# Patient Record
Sex: FEMALE | Race: WHITE | Employment: FULL TIME | URBAN - METROPOLITAN AREA
[De-identification: names, ages, dates, MRNs, and addresses within clinical notes are randomized per-mention and may not be internally consistent; named-entity substitution may affect disease eponyms.]

---

## 2019-03-01 ENCOUNTER — HOSPITAL ENCOUNTER (OUTPATIENT)
Dept: CT IMAGING | Age: 44
Discharge: HOME | End: 2019-03-01
Payer: SELF-PAY

## 2019-03-01 DIAGNOSIS — E78.00 HIGH CHOLESTEROL: ICD-10-CM

## 2019-03-01 PROCEDURE — 75571 CT HRT W/O DYE W/CA TEST: CPT

## 2020-09-30 ENCOUNTER — TELEPHONE (OUTPATIENT)
Dept: SURGERY | Age: 45
End: 2020-09-30

## 2020-09-30 NOTE — TELEPHONE ENCOUNTER
Flora SCOTT/REGINA on nurse voicemail letting us know that she had shared patient's imaging via [employer]. I had the mammogram department check, and these are not in the system. I called her back and let her know that if she shares them via [employer] that she must use the tag [id number]. I let her know that we prefer that this are sent by  to our office for the patient's appointment. I left my name and number, but I did not hear back from her.

## 2020-10-01 ENCOUNTER — DOCUMENTATION ONLY (OUTPATIENT)
Dept: SURGERY | Age: 45
End: 2020-10-01

## 2020-10-19 ENCOUNTER — TELEPHONE (OUTPATIENT)
Dept: SURGERY | Age: 45
End: 2020-10-19

## 2020-10-19 ENCOUNTER — DOCUMENTATION ONLY (OUTPATIENT)
Dept: SURGERY | Age: 45
End: 2020-10-19

## 2020-10-19 NOTE — TELEPHONE ENCOUNTER
Called patient because she cancelled her breast talk appointment. Wanted to have her permission to shred her disc from [facility]. She has chosen another provider and gives me permission to shred her disc from [facility]. She was very appreciative.

## 2022-07-02 ENCOUNTER — HOSPITAL ENCOUNTER (EMERGENCY)
Age: 47
Discharge: HOME | End: 2022-07-02
Attending: STUDENT IN AN ORGANIZED HEALTH CARE EDUCATION/TRAINING PROGRAM
Payer: COMMERCIAL

## 2022-07-02 VITALS
SYSTOLIC BLOOD PRESSURE: 133 MMHG | DIASTOLIC BLOOD PRESSURE: 79 MMHG | HEIGHT: 69 IN | RESPIRATION RATE: 16 BRPM | HEART RATE: 66 BPM | WEIGHT: 140 LBS | TEMPERATURE: 98.2 F | BODY MASS INDEX: 20.73 KG/M2 | OXYGEN SATURATION: 95 %

## 2022-07-02 DIAGNOSIS — S61.012A LACERATION OF LEFT THUMB WITHOUT FOREIGN BODY WITHOUT DAMAGE TO NAIL, INITIAL ENCOUNTER: Primary | ICD-10-CM

## 2022-07-02 PROCEDURE — 99282 EMERGENCY DEPT VISIT SF MDM: CPT

## 2022-07-02 PROCEDURE — 75810000293 HC SIMP/SUPERF WND  RPR

## 2022-07-02 RX ORDER — TAMOXIFEN CITRATE 20 MG/1
20 TABLET ORAL DAILY
COMMUNITY

## 2022-07-02 RX ORDER — LIDOCAINE HYDROCHLORIDE 10 MG/ML
5 INJECTION INFILTRATION; PERINEURAL ONCE
Status: DISCONTINUED | OUTPATIENT
Start: 2022-07-02 | End: 2022-07-02 | Stop reason: HOSPADM

## 2022-07-02 RX ORDER — IBUPROFEN 400 MG/1
400 TABLET ORAL
COMMUNITY

## 2022-07-02 NOTE — ED TRIAGE NOTES
Pt reports cuttin gher left thumb on a  blade around 1300 today. Bleeding controlled with pressure. Pt able to bend thumb without difficulty.

## 2022-07-03 NOTE — ED PROVIDER NOTES
The patient is a 79-year-old female presenting today with a laceration. She reports that she cut her left thumb on a  blade, that was not running, prior to arrival.  She went to her neighbor's house who is an ER doctor who thought about gluing it but since the bleeding would not stop she came here. Minimal pain noted. Her last tetanus shot was 7 years ago. She has no other injuries. Past Medical History:   Diagnosis Date    Breast cancer ([address]     Hypercholesterolemia        Past Surgical History:   Procedure Laterality Date    HX BILATERAL MASTECTOMY Bilateral 04/2021    HX GYN  1993    cone biopsy         History reviewed. No pertinent family history. Social History     Socioeconomic History    Marital status:      Spouse name: Not on file    Number of children: Not on file    Years of education: Not on file    Highest education level: Not on file   Occupational History    Not on file   Tobacco Use    Smoking status: Never Smoker    Smokeless tobacco: Never Used   Substance and Sexual Activity    Alcohol use: Yes     Comment: occasional    Drug use: Never    Sexual activity: Yes     Partners: Male     Birth control/protection: None, Surgical   Other Topics Concern    Not on file   Social History Narrative    Not on file     Social Determinants of Health     Financial Resource Strain:     Difficulty of Paying Living Expenses: Not on file   Food Insecurity:     Worried About Running Out of Food in the Last Year: Not on file    [place] of Food in the Last Year: Not on file   Transportation Needs:     Lack of Transportation (Medical): Not on file    Lack of Transportation (Non-Medical):  Not on file   Physical Activity:     Days of Exercise per Week: Not on file    Minutes of Exercise per Session: Not on file   Stress:     Feeling of Stress : Not on file   Social Connections:     Frequency of Communication with Friends and Family: Not on file    Frequency of Social Gatherings with Friends and Family: Not on file    Attends Confucianism Services: Not on file    Active Member of Clubs or Organizations: Not on file    Attends Club or Organization Meetings: Not on file    Marital Status: Not on file   Intimate Partner Violence:     Fear of Current or Ex-Partner: Not on file    Emotionally Abused: Not on file    Physically Abused: Not on file    Sexually Abused: Not on file   Housing Stability:     Unable to Pay for Housing in the Last Year: Not on file    Number of [place] in the Last Year: Not on file    Unstable Housing in the Last Year: Not on file         ALLERGIES: Pcn [penicillins]    Review of Systems   Skin: Positive for wound. All other systems reviewed and are negative. Vitals:    07/02/22 1657 07/02/22 1658 07/02/22 1659 07/02/22 1701   BP:    133/79   Pulse:    66   Resp:    16   Temp:       SpO2: 100% 100% 100% 95%   Weight:       Height:                Physical Exam  Constitutional:       General: She is not in acute distress. Appearance: She is well-developed. HENT:      Head: Normocephalic. Eyes:      Conjunctiva/sclera: Conjunctivae normal.   Pulmonary:      Effort: Pulmonary effort is normal. No respiratory distress. Musculoskeletal:         General: Normal range of motion. Cervical back: Normal range of motion. Skin:     General: Skin is warm and dry. Capillary Refill: Capillary refill takes less than 2 seconds. Comments: 1.5 cm laceration to the lateral aspect of her left thumb, not overlying the joint. Appears to be superficial.  Full range of motion of all joints of the thumb. Neurovascularly intact. Psychiatric:         Behavior: Behavior normal.          MDM       Procedures            Procedure Note - Wound Repair:    Performed by Charmaine Rubinstein, DO .      Immediately prior to the procedure, the patient was reevaluated and found suitable for the planned procedure and any planned medications. Immediately prior to the procedure a time out was called to verify the correct patient, procedure, equipment, staff, and marking as appropriate. Tendon/Joint function was Intact. Neurovascular function was Intact. Wound irrigated with normal saline and explored. Wound was located on the left thumb, measured 1.5 cm and was linear and clean wound edges. Level of complexity was: simple. Wound was closed using Dermabond. Foreign body was not suspected. Foreign body was not found. Procedure was tolerated well.

## 2022-08-09 ENCOUNTER — OFFICE VISIT (OUTPATIENT)
Dept: ORTHOPEDIC SURGERY | Age: 47
End: 2022-08-09
Payer: COMMERCIAL

## 2022-08-09 VITALS — HEIGHT: 69 IN | BODY MASS INDEX: 20.73 KG/M2 | WEIGHT: 140 LBS

## 2022-08-09 DIAGNOSIS — G89.29 CHRONIC BILATERAL LOW BACK PAIN WITH LEFT-SIDED SCIATICA: Primary | ICD-10-CM

## 2022-08-09 DIAGNOSIS — Z85.3 HISTORY OF BREAST CANCER IN FEMALE: ICD-10-CM

## 2022-08-09 DIAGNOSIS — M54.41 CHRONIC RIGHT-SIDED LOW BACK PAIN WITH RIGHT-SIDED SCIATICA: ICD-10-CM

## 2022-08-09 DIAGNOSIS — M54.16 LUMBAR RADICULOPATHY: ICD-10-CM

## 2022-08-09 DIAGNOSIS — M54.42 CHRONIC BILATERAL LOW BACK PAIN WITH LEFT-SIDED SCIATICA: Primary | ICD-10-CM

## 2022-08-09 DIAGNOSIS — G89.29 CHRONIC RIGHT-SIDED LOW BACK PAIN WITH RIGHT-SIDED SCIATICA: ICD-10-CM

## 2022-08-09 DIAGNOSIS — M51.36 DEGENERATIVE DISC DISEASE, LUMBAR: ICD-10-CM

## 2022-08-09 PROCEDURE — 99204 OFFICE O/P NEW MOD 45 MIN: CPT | Performed by: STUDENT IN AN ORGANIZED HEALTH CARE EDUCATION/TRAINING PROGRAM

## 2022-08-09 NOTE — PROGRESS NOTES
Giana Zuleta (: 1975) is a 49 y.o. female here for evaluation of the following chief complaint(s):  Back Pain       ASSESSMENT/PLAN:  Below is the assessment and plan develop[d based on review of pertinent history, physical exam, labs, studies, and medications. 1. Chronic bilateral low back pain with left-sided sciatica  -     XR SPINE LUMB 2 OR 3 V; Future  -     MRI LUMB SPINE WO CONT; Future  2. Degenerative disc disease, lumbar  -     MRI LUMB SPINE WO CONT; Future  3. Lumbar radiculopathy  -     MRI LUMB SPINE WO CONT; Future  4. History of breast cancer in female  5. Chronic right-sided low back pain with right-sided sciatica      Return in about 4 weeks (around 2022). Patient has failed conservative management in the form of physical therapy and steroids. I would like to order an MRI lumbar spine to further assess. I will see her back afterwards. She should continue physical therapy in the meantime. If she needs pain relief she can take over-the-counter Aleve 440 mg twice a day for up to 2 weeks. Red flag symptoms discussed with the patient. Patient is to present to the emergency department if any of these symptoms occur. Patient verbalized understanding and agrees to proceed with the aforementioned plan. Thank you for allowing me to participate in the care of this patient. SUBJECTIVE/OBJECTIVE:    HPI    Patient is a pleasant active 40-year-old female with a significant past medical history of breast cancer 2 years ago treated with surgery and chemotherapy. She is cancer free at this time and is being followed routinely every 3 months. She did have a remote history of finger neuropathy from her chemotherapy which has resolved with Cymbalta and time. She is here today for roughly 6-week history of left greater than right leg pain extending into her buttock, posterolateral thigh, and lateral leg.   Pain is worse with sitting, better with lying down and walking. She has associated paresthesias. She has no weakness. She has taken a course of steroids which helped for approximately 1 week. She is in physical therapy currently and feels that it is helping somewhat. She denies red flag symptoms. She has no constitutional symptoms. Chief Complaint   Patient presents with    Back Pain     Current Outpatient Medications   Medication Sig    tamoxifen (NOLVADEX) 20 mg tablet Take 20 mg by mouth daily. ibuprofen (MOTRIN) 400 mg tablet Take 400 mg by mouth every six (6) hours as needed for Pain.    simvastatin (ZOCOR) 10 mg tablet Take 20 mg by mouth nightly. No current facility-administered medications for this visit. Past Medical History:   Diagnosis Date    Breast cancer ([facility] [address]     Hypercholesterolemia      Past Surgical History:   Procedure Laterality Date    HX BILATERAL MASTECTOMY Bilateral 04/2021    HX GYN  1993    cone biopsy     History reviewed. No pertinent family history.   Social History     Tobacco Use    Smoking status: Never    Smokeless tobacco: Never   Substance Use Topics    Alcohol use: Yes     Comment: occasional    Drug use: Never      Social History     Tobacco Use   Smoking Status Never   Smokeless Tobacco Never     Social History     Substance and Sexual Activity   Alcohol Use Yes    Comment: occasional       Review of Systems  Red flag symptoms: None  Bowel/Bladder/Saddle Anesthesia: Denies  Weakness/Sensory Disturbance: No weakness, left leg paresthesias  Ambulation/Falls: Ambulates without assist, no fall history    Ht 5' 9\" (1.753 m)   Wt 140 lb (63.5 kg)   BMI 20.67 kg/m²      Physical Exam    GENERAL:  AAOx3, appears stated age, no distress  Body habitus: Normal    LOWER EXTREMITIES:  Gait: Intact heel toe and tandem gait   Motor: 5/5 in all myotomes L3-S1 bilaterally  Sensory: Intact to light touch in all dermatomes L4-S1 bilaterally  Reflexes: Absent but symmetric L4 and S1 bilaterally  Pathological reflexes: No sustained clonus, downgoing Babinski bilaterally   Special tests: Negative seated SLR bilaterally    IMAGING:    XR Results (most recent):  Results from Appointment encounter on 08/09/22    XR SPINE LUMB 2 OR 3 V    Narrative  4 view lumbar spine including flexion-extension with normal lordosis and moderate disc degeneration with minimal disc height collapse at L5-S1. Mild to moderate neuroforaminal stenosis at this level. No instability on dynamic films. No coronal deformity. An electronic signature was used to authenticate this note.   -- Calderon Martines, DO

## 2022-08-16 ENCOUNTER — HOSPITAL ENCOUNTER (OUTPATIENT)
Dept: MRI IMAGING | Age: 47
Discharge: HOME | End: 2022-08-16
Attending: STUDENT IN AN ORGANIZED HEALTH CARE EDUCATION/TRAINING PROGRAM
Payer: COMMERCIAL

## 2022-08-16 DIAGNOSIS — M54.16 LUMBAR RADICULOPATHY: ICD-10-CM

## 2022-08-16 DIAGNOSIS — M54.42 CHRONIC BILATERAL LOW BACK PAIN WITH LEFT-SIDED SCIATICA: ICD-10-CM

## 2022-08-16 DIAGNOSIS — M51.36 DEGENERATIVE DISC DISEASE, LUMBAR: ICD-10-CM

## 2022-08-16 DIAGNOSIS — G89.29 CHRONIC BILATERAL LOW BACK PAIN WITH LEFT-SIDED SCIATICA: ICD-10-CM

## 2022-08-16 PROCEDURE — 72148 MRI LUMBAR SPINE W/O DYE: CPT

## 2022-08-19 ENCOUNTER — TELEPHONE (OUTPATIENT)
Dept: ORTHOPEDIC SURGERY | Age: 47
End: 2022-08-19

## 2022-08-19 NOTE — TELEPHONE ENCOUNTER
Insurance company wants a peer to peer with Dr. Haim Curtis about the MRI ordered. Call [phone]. Case #[id number]         Note    Brian Arnold DENIED     Procedures:  [id number] - MRI LUMB SPINE WO CONT  INSURANCE:   SOURCE: Website  SOURCE DETAILS: [facility]  CASE/TRACKING #: [id number]  DENIAL REASON: Your doctor told us that you have low back pain. Your doctor ordered an MRI of your lower back. An MRI is a way to take pictures of the inside of your body. This test should be used when the pain has not improved after six weeks of treatment by your doctor. Treatment should include medications and other forms of therapy. These need to include home exercises or physical therapy. We reviewed the notes we have. The notes do not show that you had at least six weeks of such treatment. Based on the information we have, this test is not medically necessary. We used [facility] Guideline titled Imaging of the Spine to make this decision. You may view this guideline at [email or web address]. CALL REF #: n/a  P2P phone#: [phone]  P2P expiration date: n/a     MDO CONTACT INFO: [phone]  SPOKE TO: NORM Staples, Dr. Kristin Myers Nurse left p2p phone number and order ID in  along with my direct phone number.    MDO CONTACT DATE: 08/17/2022

## 2022-08-23 ENCOUNTER — OFFICE VISIT (OUTPATIENT)
Dept: ORTHOPEDIC SURGERY | Age: 47
End: 2022-08-23
Payer: COMMERCIAL

## 2022-08-23 VITALS — HEIGHT: 69 IN | WEIGHT: 140 LBS | BODY MASS INDEX: 20.73 KG/M2

## 2022-08-23 DIAGNOSIS — M51.26 LUMBAR DISC HERNIATION: Primary | ICD-10-CM

## 2022-08-23 DIAGNOSIS — M54.16 LUMBAR RADICULOPATHY, CHRONIC: ICD-10-CM

## 2022-08-23 PROCEDURE — 99214 OFFICE O/P EST MOD 30 MIN: CPT | Performed by: STUDENT IN AN ORGANIZED HEALTH CARE EDUCATION/TRAINING PROGRAM

## 2022-08-23 NOTE — PROGRESS NOTES
Yokasta Dutton (: 1975) is a 49 y.o. female here for evaluation of the following chief complaint(s):  Back Pain (MRI results.//)       ASSESSMENT/PLAN:  Below is the assessment and plan developed based on review of pertinent history, physical exam, labs, studies, and medications. 1. Lumbar disc herniation  2. Lumbar radiculopathy, chronic    Return in about 2 months (around 10/23/2022). Patient continues to improve with conservative management. I would like to see her back in 2 months for interval evaluation. Continue physical therapy in the meantime. Red flag symptoms discussed with the patient. Patient is to present to the emergency department if any of these symptoms occur. Patient verbalized understanding and agrees to proceed with the aforementioned plan. Thank you for allowing me to participate in the care of this patient. SUBJECTIVE/OBJECTIVE:    HPI    Patient is a pleasant 25-year-old female who is well-known to the spine service. She is here today for MRI review. She has a L5-S1 disc herniation but she is improving clinically with physical therapy and time. Her pain is primarily in her buttock and proximal posterior thigh at this point time. She has no red flag symptoms. Chief Complaint   Patient presents with    Back Pain     MRI results. Current Outpatient Medications   Medication Sig    tamoxifen (NOLVADEX) 20 mg tablet Take 20 mg by mouth daily. ibuprofen (MOTRIN) 400 mg tablet Take 400 mg by mouth every six (6) hours as needed for Pain.    simvastatin (ZOCOR) 10 mg tablet Take 20 mg by mouth nightly. No current facility-administered medications for this visit. Past Medical History:   Diagnosis Date    Breast cancer ([facility] [address]     Hypercholesterolemia      Past Surgical History:   Procedure Laterality Date    HX BILATERAL MASTECTOMY Bilateral 2021    HX GYN  1993    cone biopsy     History reviewed.  No pertinent family history. Social History     Tobacco Use    Smoking status: Never    Smokeless tobacco: Never   Substance Use Topics    Alcohol use: Yes     Comment: occasional    Drug use: Never      Social History     Tobacco Use   Smoking Status Never   Smokeless Tobacco Never     Social History     Substance and Sexual Activity   Alcohol Use Yes    Comment: occasional     Review of Systems  Red flag symptoms: None  Bowel/Bladder/Saddle Anesthesia: Denies  Weakness/Sensory Disturbance: No weakness, left leg paresthesias but improving  Ambulation/Falls: Ambulates without assist, no fall history     Ht 5' 9\" (1.753 m)   Wt 140 lb (63.5 kg)   BMI 20.67 kg/m²       Physical Exam     GENERAL:  AAOx3, appears stated age, no distress  Body habitus: Normal     LOWER EXTREMITIES:  Gait: Intact heel toe and tandem gait   Motor: 5/5 in all myotomes L3-S1 bilaterally  Sensory: Intact to light touch in all dermatomes L4-S1 bilaterally  Reflexes: Absent but symmetric L4 and S1 bilaterally  Pathological reflexes: No sustained clonus, downgoing Babinski bilaterally   Special tests: Negative seated SLR bilaterally      IMAGING:    MRI Results (most recent):  Results from Hospital Encounter encounter on 08/16/22    MRI LUMB SPINE WO CONT    Narrative  EXAM: MRI LUMB SPINE WO CONT  Clinical history: radiculopathy  INDICATION: . Lumbago with sciatica, left side    COMPARISON: None    TECHNIQUE: MR imaging of the lumbar spine was performed using the following  sequences: sagittal T1, T2, STIR;  axial T1, T2.    CONTRAST:  None. FINDINGS:    Type I Modic degenerative change at L5-S1. Trace retrolisthesis. The abdominal  aorta is normal. The proximal common iliac vessels are normal in caliber. Vertebral body heights are maintained. Marrow signal is normal.    The conus medullaris terminates at T12/L1. Signal and caliber of the distal  spinal cord are within normal limits. The paraspinal soft tissues are within normal limits.     Lower thoracic spine: No herniation or stenosis. L1-L2: No herniation or stenosis. L2-L3: No herniation or stenosis. L3-L4: No herniation or stenosis. L4-L5: Minimal disc desiccation. Mild central protrusion with symmetric. Minimal  ligamentum flavum hypertrophy. Canal is patent. Foramina are patent    L5-S1: Minimal retrolisthesis measures 3 mm. Mild facet arthropathy. Central,  left paracentral and left lateral recess disc protrusion with superimposed  caudally oriented extrusion at the left lateral recess. There is severe canal  stenosis posterior to S1. There is moderate left foraminal stenosis. Effacement  of nerve roots extending to the left S1 foramen. Impression  Disc degenerative change is most pronounced at L5-S1. Caudally oriented disc extrusion at the left lateral recess/left paracentral  region of L5-S1. Severe canal stenosis posterior to the S1 vertebral level. Moderate left foraminal stenosis at L5-S1 with effacement of nerve roots  extending to the left S1 foramen. Type I Modic degenerative changes at L5-S1. An electronic signature was used to authenticate this note.   -- Georgi Means, DO

## 2022-11-21 ENCOUNTER — OFFICE VISIT (OUTPATIENT)
Dept: ORTHOPEDIC SURGERY | Age: 47
End: 2022-11-21
Payer: COMMERCIAL

## 2022-11-21 VITALS — WEIGHT: 140 LBS | HEIGHT: 69 IN | BODY MASS INDEX: 20.73 KG/M2

## 2022-11-21 DIAGNOSIS — M51.26 LUMBAR DISC HERNIATION: Primary | ICD-10-CM

## 2022-11-21 DIAGNOSIS — G89.29 CHRONIC BILATERAL LOW BACK PAIN WITH LEFT-SIDED SCIATICA: ICD-10-CM

## 2022-11-21 DIAGNOSIS — M54.42 CHRONIC BILATERAL LOW BACK PAIN WITH LEFT-SIDED SCIATICA: ICD-10-CM

## 2022-11-21 DIAGNOSIS — M54.16 LUMBAR RADICULOPATHY: ICD-10-CM

## 2022-11-21 DIAGNOSIS — Z85.3 HISTORY OF BREAST CANCER IN FEMALE: ICD-10-CM

## 2022-11-21 DIAGNOSIS — M54.16 LUMBAR RADICULOPATHY, CHRONIC: ICD-10-CM

## 2022-11-21 DIAGNOSIS — M51.36 DEGENERATIVE DISC DISEASE, LUMBAR: ICD-10-CM

## 2022-11-21 PROCEDURE — 99213 OFFICE O/P EST LOW 20 MIN: CPT | Performed by: STUDENT IN AN ORGANIZED HEALTH CARE EDUCATION/TRAINING PROGRAM

## 2022-11-21 NOTE — PROGRESS NOTES
Xi Packer (: 1975) is a 49 y.o. female here for evaluation of the following chief complaint(s):  Back Pain and Follow-up (PT helped with pain.)       ASSESSMENT/PLAN:  Below is the assessment and plan developed based on review of pertinent history, physical exam, labs, studies, and medications. 1. Lumbar disc herniation  2. Lumbar radiculopathy, chronic  3. Chronic bilateral low back pain with left-sided sciatica  4. Lumbar radiculopathy  5. History of breast cancer in female  6. Degenerative disc disease, lumbar    Return in about 6 months (around 2023) for Routine Follow Up (Non-Op). Patient's pain is about 75% improved. If her pain recurs she can simply call us for a telehealth visit and we will remotely order a lumbar epidural steroid injection. SUBJECTIVE/OBJECTIVE:  HPI  Patient is a pleasant 67-year-old female who is well-known to the spine service. She is here today for interval evaluation and routine follow-up. Her lumbar radiculopathy is approximately 75% improved. She has been compliant with conservative management in the form of physical therapy and anti-inflammatory medications in time. She has no new red flag symptoms. Review of Systems  See above HPI and prior clinic notes for full ROS     Ht 5' 9\" (1.753 m)   Wt 140 lb (63.5 kg)   BMI 20.67 kg/m²    Physical Exam  No interval change in PE, grossly motor/sensory intact, no new neurological deficits    IMAGING:  No new imaging obtained today. An electronic signature was used to authenticate this note.   -- Morgan Rodriguez DO

## 2023-05-22 RX ORDER — SIMVASTATIN 10 MG
20 TABLET ORAL NIGHTLY
COMMUNITY

## 2023-05-22 RX ORDER — TAMOXIFEN CITRATE 20 MG/1
20 TABLET ORAL DAILY
COMMUNITY

## 2023-05-22 RX ORDER — IBUPROFEN 400 MG/1
400 TABLET ORAL EVERY 6 HOURS PRN
COMMUNITY

## 2024-09-04 ENCOUNTER — APPOINTMENT (OUTPATIENT)
Facility: HOSPITAL | Age: 49
DRG: 069 | End: 2024-09-04
Payer: COMMERCIAL

## 2024-09-04 ENCOUNTER — HOSPITAL ENCOUNTER (INPATIENT)
Facility: HOSPITAL | Age: 49
LOS: 1 days | Discharge: HOME | DRG: 069 | End: 2024-09-05
Attending: EMERGENCY MEDICINE | Admitting: FAMILY MEDICINE
Payer: COMMERCIAL

## 2024-09-04 DIAGNOSIS — R29.90 STROKE-LIKE SYMPTOMS: Primary | ICD-10-CM

## 2024-09-04 DIAGNOSIS — R20.0 LEFT SIDED NUMBNESS: ICD-10-CM

## 2024-09-04 DIAGNOSIS — R56.9 SEIZURE-LIKE ACTIVITY (HCC): ICD-10-CM

## 2024-09-04 LAB
ALBUMIN SERPL-MCNC: 4 G/DL (ref 3.5–5)
ALBUMIN/GLOB SERPL: 1.2 (ref 1.1–2.2)
ALP SERPL-CCNC: 53 U/L (ref 45–117)
ALT SERPL-CCNC: 26 U/L (ref 12–78)
ANION GAP SERPL CALC-SCNC: 7 MMOL/L (ref 5–15)
AST SERPL-CCNC: 23 U/L (ref 15–37)
BASOPHILS # BLD: 0 K/UL (ref 0–0.1)
BASOPHILS NFR BLD: 1 % (ref 0–1)
BILIRUB SERPL-MCNC: 0.6 MG/DL (ref 0.2–1)
BUN SERPL-MCNC: 9 MG/DL (ref 6–20)
BUN/CREAT SERPL: 10 (ref 12–20)
CALCIUM SERPL-MCNC: 8.7 MG/DL (ref 8.5–10.1)
CHLORIDE SERPL-SCNC: 99 MMOL/L (ref 97–108)
CO2 SERPL-SCNC: 32 MMOL/L (ref 21–32)
CREAT SERPL-MCNC: 0.92 MG/DL (ref 0.55–1.02)
DIFFERENTIAL METHOD BLD: NORMAL
EKG ATRIAL RATE: 87 BPM
EKG DIAGNOSIS: NORMAL
EKG P AXIS: 84 DEGREES
EKG P-R INTERVAL: 142 MS
EKG Q-T INTERVAL: 398 MS
EKG QRS DURATION: 88 MS
EKG QTC CALCULATION (BAZETT): 478 MS
EKG R AXIS: 84 DEGREES
EKG T AXIS: 67 DEGREES
EKG VENTRICULAR RATE: 87 BPM
EOSINOPHIL # BLD: 0 K/UL (ref 0–0.4)
EOSINOPHIL NFR BLD: 1 % (ref 0–7)
ERYTHROCYTE [DISTWIDTH] IN BLOOD BY AUTOMATED COUNT: 11.9 % (ref 11.5–14.5)
GLOBULIN SER CALC-MCNC: 3.4 G/DL (ref 2–4)
GLUCOSE BLD STRIP.AUTO-MCNC: 133 MG/DL (ref 65–117)
GLUCOSE SERPL-MCNC: 136 MG/DL (ref 65–100)
HCT VFR BLD AUTO: 38.6 % (ref 35–47)
HGB BLD-MCNC: 12.8 G/DL (ref 11.5–16)
IMM GRANULOCYTES # BLD AUTO: 0 K/UL (ref 0–0.04)
IMM GRANULOCYTES NFR BLD AUTO: 0 % (ref 0–0.5)
LYMPHOCYTES # BLD: 1.6 K/UL (ref 0.8–3.5)
LYMPHOCYTES NFR BLD: 30 % (ref 12–49)
MCH RBC QN AUTO: 30.6 PG (ref 26–34)
MCHC RBC AUTO-ENTMCNC: 33.2 G/DL (ref 30–36.5)
MCV RBC AUTO: 92.3 FL (ref 80–99)
MONOCYTES # BLD: 0.4 K/UL (ref 0–1)
MONOCYTES NFR BLD: 7 % (ref 5–13)
NEUTS SEG # BLD: 3.3 K/UL (ref 1.8–8)
NEUTS SEG NFR BLD: 61 % (ref 32–75)
NRBC # BLD: 0 K/UL (ref 0–0.01)
NRBC BLD-RTO: 0 PER 100 WBC
PLATELET # BLD AUTO: 270 K/UL (ref 150–400)
PMV BLD AUTO: 9.8 FL (ref 8.9–12.9)
POTASSIUM SERPL-SCNC: 3.8 MMOL/L (ref 3.5–5.1)
PROT SERPL-MCNC: 7.4 G/DL (ref 6.4–8.2)
RBC # BLD AUTO: 4.18 M/UL (ref 3.8–5.2)
SERVICE CMNT-IMP: ABNORMAL
SODIUM SERPL-SCNC: 138 MMOL/L (ref 136–145)
WBC # BLD AUTO: 5.4 K/UL (ref 3.6–11)

## 2024-09-04 PROCEDURE — 2580000003 HC RX 258: Performed by: FAMILY MEDICINE

## 2024-09-04 PROCEDURE — 70551 MRI BRAIN STEM W/O DYE: CPT

## 2024-09-04 PROCEDURE — 70450 CT HEAD/BRAIN W/O DYE: CPT

## 2024-09-04 PROCEDURE — G0378 HOSPITAL OBSERVATION PER HR: HCPCS

## 2024-09-04 PROCEDURE — 4A03X5D MEASUREMENT OF ARTERIAL FLOW, INTRACRANIAL, EXTERNAL APPROACH: ICD-10-PCS | Performed by: FAMILY MEDICINE

## 2024-09-04 PROCEDURE — 99285 EMERGENCY DEPT VISIT HI MDM: CPT

## 2024-09-04 PROCEDURE — 6370000000 HC RX 637 (ALT 250 FOR IP): Performed by: FAMILY MEDICINE

## 2024-09-04 PROCEDURE — 6360000004 HC RX CONTRAST MEDICATION: Performed by: EMERGENCY MEDICINE

## 2024-09-04 PROCEDURE — 85025 COMPLETE CBC W/AUTO DIFF WBC: CPT

## 2024-09-04 PROCEDURE — 36415 COLL VENOUS BLD VENIPUNCTURE: CPT

## 2024-09-04 PROCEDURE — 93005 ELECTROCARDIOGRAM TRACING: CPT | Performed by: EMERGENCY MEDICINE

## 2024-09-04 PROCEDURE — 70496 CT ANGIOGRAPHY HEAD: CPT

## 2024-09-04 PROCEDURE — 82962 GLUCOSE BLOOD TEST: CPT

## 2024-09-04 PROCEDURE — 80053 COMPREHEN METABOLIC PANEL: CPT

## 2024-09-04 RX ORDER — ACETAMINOPHEN 325 MG/1
650 TABLET ORAL EVERY 4 HOURS PRN
Status: DISCONTINUED | OUTPATIENT
Start: 2024-09-04 | End: 2024-09-05 | Stop reason: HOSPADM

## 2024-09-04 RX ORDER — ACETAMINOPHEN 650 MG/1
650 SUPPOSITORY RECTAL EVERY 4 HOURS PRN
Status: DISCONTINUED | OUTPATIENT
Start: 2024-09-04 | End: 2024-09-05 | Stop reason: HOSPADM

## 2024-09-04 RX ORDER — ASPIRIN 300 MG/1
300 SUPPOSITORY RECTAL DAILY
Status: DISCONTINUED | OUTPATIENT
Start: 2024-09-04 | End: 2024-09-05 | Stop reason: HOSPADM

## 2024-09-04 RX ORDER — IOPAMIDOL 755 MG/ML
100 INJECTION, SOLUTION INTRAVASCULAR
Status: COMPLETED | OUTPATIENT
Start: 2024-09-04 | End: 2024-09-04

## 2024-09-04 RX ORDER — ONDANSETRON 4 MG/1
4 TABLET, ORALLY DISINTEGRATING ORAL EVERY 8 HOURS PRN
Status: DISCONTINUED | OUTPATIENT
Start: 2024-09-04 | End: 2024-09-05 | Stop reason: HOSPADM

## 2024-09-04 RX ORDER — VENLAFAXINE 37.5 MG/1
37.5 TABLET ORAL NIGHTLY
COMMUNITY

## 2024-09-04 RX ORDER — SODIUM CHLORIDE 0.9 % (FLUSH) 0.9 %
5-40 SYRINGE (ML) INJECTION EVERY 12 HOURS SCHEDULED
Status: DISCONTINUED | OUTPATIENT
Start: 2024-09-04 | End: 2024-09-05 | Stop reason: HOSPADM

## 2024-09-04 RX ORDER — SODIUM CHLORIDE 9 MG/ML
INJECTION, SOLUTION INTRAVENOUS PRN
Status: DISCONTINUED | OUTPATIENT
Start: 2024-09-04 | End: 2024-09-05 | Stop reason: HOSPADM

## 2024-09-04 RX ORDER — ASPIRIN 81 MG/1
81 TABLET, CHEWABLE ORAL DAILY
Status: DISCONTINUED | OUTPATIENT
Start: 2024-09-04 | End: 2024-09-05 | Stop reason: HOSPADM

## 2024-09-04 RX ORDER — POLYETHYLENE GLYCOL 3350 17 G/17G
17 POWDER, FOR SOLUTION ORAL DAILY PRN
Status: DISCONTINUED | OUTPATIENT
Start: 2024-09-04 | End: 2024-09-05 | Stop reason: HOSPADM

## 2024-09-04 RX ORDER — VENLAFAXINE 37.5 MG/1
37.5 TABLET ORAL NIGHTLY
Status: DISCONTINUED | OUTPATIENT
Start: 2024-09-04 | End: 2024-09-05 | Stop reason: HOSPADM

## 2024-09-04 RX ORDER — TAMOXIFEN CITRATE 10 MG/1
20 TABLET ORAL NIGHTLY
Status: DISCONTINUED | OUTPATIENT
Start: 2024-09-04 | End: 2024-09-05 | Stop reason: HOSPADM

## 2024-09-04 RX ORDER — LORAZEPAM 2 MG/ML
INJECTION INTRAMUSCULAR
Status: DISCONTINUED
Start: 2024-09-04 | End: 2024-09-04 | Stop reason: WASHOUT

## 2024-09-04 RX ORDER — SODIUM CHLORIDE 0.9 % (FLUSH) 0.9 %
5-40 SYRINGE (ML) INJECTION PRN
Status: DISCONTINUED | OUTPATIENT
Start: 2024-09-04 | End: 2024-09-05 | Stop reason: HOSPADM

## 2024-09-04 RX ORDER — ATORVASTATIN CALCIUM 10 MG/1
10 TABLET, FILM COATED ORAL DAILY
Status: DISCONTINUED | OUTPATIENT
Start: 2024-09-05 | End: 2024-09-05

## 2024-09-04 RX ORDER — ONDANSETRON 2 MG/ML
4 INJECTION INTRAMUSCULAR; INTRAVENOUS EVERY 6 HOURS PRN
Status: DISCONTINUED | OUTPATIENT
Start: 2024-09-04 | End: 2024-09-05 | Stop reason: HOSPADM

## 2024-09-04 RX ADMIN — SODIUM CHLORIDE, PRESERVATIVE FREE 10 ML: 5 INJECTION INTRAVENOUS at 21:02

## 2024-09-04 RX ADMIN — VENLAFAXINE 37.5 MG: 37.5 TABLET ORAL at 22:51

## 2024-09-04 RX ADMIN — TAMOXIFEN CITRATE 20 MG: 10 TABLET ORAL at 22:51

## 2024-09-04 RX ADMIN — IOPAMIDOL 100 ML: 755 INJECTION, SOLUTION INTRAVENOUS at 12:28

## 2024-09-04 RX ADMIN — ASPIRIN 81 MG CHEWABLE TABLET 81 MG: 81 TABLET CHEWABLE at 21:02

## 2024-09-04 ASSESSMENT — PAIN - FUNCTIONAL ASSESSMENT: PAIN_FUNCTIONAL_ASSESSMENT: NONE - DENIES PAIN

## 2024-09-04 NOTE — ED NOTES
4/2- lvm for patient to call back to reschedule her appt to see MD.[facility]   4/5- spoke with patient let her know we need to reschedule her appt to see MD's she states she'll call back currently at work.[facility]   4/10-LVM FOR PATIENT TO CALL BACK TO RESCHEDULE APPTS.[facility]      Emergency Line line, Code Stroke Level 2 Van Negative

## 2024-09-04 NOTE — ED TRIAGE NOTES
Patient is ambulatory in Triage with a steady gait. Patient reports this morning woke up at around 9am with numbness to the left leg and left arm. Last known well last night at 2300. Patient reports Hx of herniation disk. Reports chronic back pain, Saturday reports fainting episode of syncope with LOC when was getting up of the bed. In Triage patient reports lesser sensation to the left arm only, no weakness to legs or arms. NIH 1 (left arm decreased sensation). Reports well fell, she hit her left side (arm and leg).  in Triage.

## 2024-09-04 NOTE — ED NOTES
Patient had a witnessed seizure in T1 that lasted a few seconds. Patient alert but pale and diaphoretic post seizure. Dr Raphael called to triage.

## 2024-09-04 NOTE — ED PROVIDER NOTES
[facility] EMERGENCY CTR  EMERGENCY DEPARTMENT ENCOUNTER      Pt Name: Nicole Caceres  MRN: [id number]  Birthdate 1975  Date of evaluation: 9/4/2024  Provider: Kushal Raphael MD    CHIEF COMPLAINT       Chief Complaint   Patient presents with    Back Pain    Numbness         HISTORY OF PRESENT ILLNESS   (Location/Symptom, Timing/Onset, Context/Setting, Quality, Duration, Modifying Factors, Severity)  Note limiting factors.   Ms. Caceres is a 49-year-old female who presents to the ER with complaints of left arm and leg numbness.  She said that she went to bed around 930 last night.  She not have any numbness at that time.  When she woke this morning, she noted that her left arm and left leg were numb.  She denied having difficulty using it at that time.  She reports that on Saturday she had an episode where she fell and lost consciousness.  Her  states that he was in the room with her prior to this.  He had stepped outside and heard her fall.  She had lost consciousness but she was awake when he got back to the room.  No seizure-like activity was noted.    Just prior to my initial assessment in the emergency room, she had just gotten IV.  She had an episode where she became unresponsive.  She was having contractures of her arms and legs and o'clock she was posturing briefly.  This episode lasted a brief period of time, likely less than 15 to 30 seconds.  She seemed somewhat confused afterwards but was able to answer questions appropriately.                Review of External Medical Records:     Nursing Notes were reviewed.    REVIEW OF SYSTEMS    (2-9 systems for level 4, 10 or more for level 5)     Review of Systems   Neurological:  Positive for syncope and numbness.       Except as noted above the remainder of the review of systems was reviewed and negative.       PAST MEDICAL HISTORY     Past Medical History:   Diagnosis Date    Breast cancer (HCC)     Hypercholesterolemia   minutes.         Ms. Caceres is a 48yo female who presents to the ER with complaints of left arm and left leg weakness.  She also had a syncopal event several days ago.  She was last normal yesterday evening.  She had an episode in the emergency room which is unclear if she had a seizure or vasovagal syncope.  She recently had an IV placed but she did have some posturing.  She seen by teleneurologist recommends admission to the hospital for stroke workup.  She is to be evaluated for admission by the hospitalist        REASSESSMENT            CONSULTS:  None    PROCEDURES:  Unless otherwise noted below, none     Procedures      FINAL IMPRESSION    No diagnosis found.      DISPOSITION/PLAN   DISPOSITION        PATIENT REFERRED TO:  No follow-up provider specified.    DISCHARGE MEDICATIONS:  New Prescriptions    No medications on file         (Please note that portions of this note were completed with a voice recognition program.  Efforts were made to edit the dictations but occasionally words are mis-transcribed.)    Kushal Raphael MD (electronically signed)  Emergency Attending Physician / Physician Assistant / Nurse Practitioner              Kushal Raphael MD  09/04/24 [id number]

## 2024-09-05 ENCOUNTER — APPOINTMENT (OUTPATIENT)
Facility: HOSPITAL | Age: 49
DRG: 069 | End: 2024-09-05
Attending: FAMILY MEDICINE
Payer: COMMERCIAL

## 2024-09-05 ENCOUNTER — APPOINTMENT (OUTPATIENT)
Facility: HOSPITAL | Age: 49
DRG: 069 | End: 2024-09-05
Payer: COMMERCIAL

## 2024-09-05 VITALS
SYSTOLIC BLOOD PRESSURE: 113 MMHG | TEMPERATURE: 98.7 F | OXYGEN SATURATION: 100 % | DIASTOLIC BLOOD PRESSURE: 75 MMHG | HEIGHT: 69 IN | BODY MASS INDEX: 22.53 KG/M2 | RESPIRATION RATE: 17 BRPM | WEIGHT: 152.12 LBS | HEART RATE: 93 BPM

## 2024-09-05 PROBLEM — R29.90 STROKE-LIKE EPISODE: Status: RESOLVED | Noted: 2024-09-05 | Resolved: 2024-09-05

## 2024-09-05 PROBLEM — R29.90 STROKE-LIKE EPISODE: Status: ACTIVE | Noted: 2024-09-05

## 2024-09-05 PROBLEM — R29.90 STROKE-LIKE SYMPTOMS: Status: RESOLVED | Noted: 2024-09-04 | Resolved: 2024-09-05

## 2024-09-05 PROBLEM — R55 VASOVAGAL SYNCOPE: Status: RESOLVED | Noted: 2024-09-05 | Resolved: 2024-09-05

## 2024-09-05 PROBLEM — R55 VASOVAGAL SYNCOPE: Status: ACTIVE | Noted: 2024-09-05

## 2024-09-05 LAB
CHOLEST SERPL-MCNC: 191 MG/DL
ECHO AO ROOT DIAM: 2 CM
ECHO AO ROOT INDEX: 1.09 CM/M2
ECHO AV AREA PEAK VELOCITY: 2.8 CM2
ECHO AV AREA VTI: 3.3 CM2
ECHO AV AREA/BSA PEAK VELOCITY: 1.5 CM2/M2
ECHO AV AREA/BSA VTI: 1.8 CM2/M2
ECHO AV MEAN GRADIENT: 2 MMHG
ECHO AV MEAN VELOCITY: 0.7 M/S
ECHO AV PEAK GRADIENT: 4 MMHG
ECHO AV PEAK VELOCITY: 1 M/S
ECHO AV VELOCITY RATIO: 0.9
ECHO AV VTI: 17.4 CM
ECHO BSA: 1.83 M2
ECHO LA DIAMETER INDEX: 1.36 CM/M2
ECHO LA DIAMETER: 2.5 CM
ECHO LA TO AORTIC ROOT RATIO: 1.25
ECHO LA VOL A-L A2C: 15 ML (ref 22–52)
ECHO LA VOL A-L A4C: 23 ML (ref 22–52)
ECHO LA VOL MOD A2C: 14 ML (ref 22–52)
ECHO LA VOL MOD A4C: 20 ML (ref 22–52)
ECHO LA VOLUME AREA LENGTH: 20 ML
ECHO LA VOLUME INDEX A-L A2C: 8 ML/M2 (ref 16–34)
ECHO LA VOLUME INDEX A-L A4C: 13 ML/M2 (ref 16–34)
ECHO LA VOLUME INDEX AREA LENGTH: 11 ML/M2 (ref 16–34)
ECHO LA VOLUME INDEX MOD A2C: 8 ML/M2 (ref 16–34)
ECHO LA VOLUME INDEX MOD A4C: 11 ML/M2 (ref 16–34)
ECHO LV E' SEPTAL VELOCITY: 7 CM/S
ECHO LV EDV A2C: 54 ML
ECHO LV EDV A4C: 64 ML
ECHO LV EDV BP: 61 ML (ref 56–104)
ECHO LV EDV INDEX A4C: 35 ML/M2
ECHO LV EDV INDEX BP: 33 ML/M2
ECHO LV EDV NDEX A2C: 29 ML/M2
ECHO LV EF PHYSICIAN: 60 %
ECHO LV EJECTION FRACTION A2C: 57 %
ECHO LV EJECTION FRACTION A4C: 61 %
ECHO LV EJECTION FRACTION BIPLANE: 57 % (ref 55–100)
ECHO LV ESV A2C: 23 ML
ECHO LV ESV A4C: 25 ML
ECHO LV ESV BP: 26 ML (ref 19–49)
ECHO LV ESV INDEX A2C: 13 ML/M2
ECHO LV ESV INDEX A4C: 14 ML/M2
ECHO LV ESV INDEX BP: 14 ML/M2
ECHO LV FRACTIONAL SHORTENING: -21 % (ref 28–44)
ECHO LV INTERNAL DIMENSION DIASTOLE INDEX: 1.52 CM/M2
ECHO LV INTERNAL DIMENSION DIASTOLIC: 2.8 CM (ref 3.9–5.3)
ECHO LV INTERNAL DIMENSION SYSTOLIC INDEX: 1.85 CM/M2
ECHO LV INTERNAL DIMENSION SYSTOLIC: 3.4 CM
ECHO LV IVSD: 0.5 CM (ref 0.6–0.9)
ECHO LV MASS 2D: 171.2 G (ref 67–162)
ECHO LV MASS INDEX 2D: 93 G/M2 (ref 43–95)
ECHO LV POSTERIOR WALL DIASTOLIC: 2.8 CM (ref 0.6–0.9)
ECHO LV RELATIVE WALL THICKNESS RATIO: 2
ECHO LVOT AREA: 3.5 CM2
ECHO LVOT AV VTI INDEX: 0.98
ECHO LVOT DIAM: 2.1 CM
ECHO LVOT MEAN GRADIENT: 2 MMHG
ECHO LVOT PEAK GRADIENT: 3 MMHG
ECHO LVOT PEAK VELOCITY: 0.9 M/S
ECHO LVOT STROKE VOLUME INDEX: 32.2 ML/M2
ECHO LVOT SV: 59.2 ML
ECHO LVOT VTI: 17.1 CM
ECHO MV A VELOCITY: 0.38 M/S
ECHO MV AREA PHT: 4 CM2
ECHO MV E DECELERATION TIME (DT): 190.8 MS
ECHO MV E VELOCITY: 0.57 M/S
ECHO MV E/A RATIO: 1.5
ECHO MV E/E' SEPTAL: 8.14
ECHO MV PRESSURE HALF TIME (PHT): 55.3 MS
ECHO RV TAPSE: 2.7 CM (ref 1.7–?)
ERYTHROCYTE [DISTWIDTH] IN BLOOD BY AUTOMATED COUNT: 11.8 % (ref 11.5–14.5)
EST. AVERAGE GLUCOSE BLD GHB EST-MCNC: 103 MG/DL
HBA1C MFR BLD: 5.2 % (ref 4–5.6)
HCT VFR BLD AUTO: 37.4 % (ref 35–47)
HDLC SERPL-MCNC: 88 MG/DL
HDLC SERPL: 2.2 (ref 0–5)
HGB BLD-MCNC: 12.2 G/DL (ref 11.5–16)
LDLC SERPL CALC-MCNC: 86.6 MG/DL (ref 0–100)
MCH RBC QN AUTO: 30.4 PG (ref 26–34)
MCHC RBC AUTO-ENTMCNC: 32.6 G/DL (ref 30–36.5)
MCV RBC AUTO: 93.3 FL (ref 80–99)
NRBC # BLD: 0 K/UL (ref 0–0.01)
NRBC BLD-RTO: 0 PER 100 WBC
PLATELET # BLD AUTO: 255 K/UL (ref 150–400)
PMV BLD AUTO: 9.8 FL (ref 8.9–12.9)
RBC # BLD AUTO: 4.01 M/UL (ref 3.8–5.2)
TRIGL SERPL-MCNC: 82 MG/DL
TROPONIN I SERPL HS-MCNC: 15 NG/L (ref 0–51)
VLDLC SERPL CALC-MCNC: 16.4 MG/DL
WBC # BLD AUTO: 6.5 K/UL (ref 3.6–11)

## 2024-09-05 PROCEDURE — 85027 COMPLETE CBC AUTOMATED: CPT

## 2024-09-05 PROCEDURE — A9579 GAD-BASE MR CONTRAST NOS,1ML: HCPCS

## 2024-09-05 PROCEDURE — 95816 EEG AWAKE AND DROWSY: CPT | Performed by: PSYCHIATRY & NEUROLOGY

## 2024-09-05 PROCEDURE — 84484 ASSAY OF TROPONIN QUANT: CPT

## 2024-09-05 PROCEDURE — G0378 HOSPITAL OBSERVATION PER HR: HCPCS

## 2024-09-05 PROCEDURE — 97161 PT EVAL LOW COMPLEX 20 MIN: CPT

## 2024-09-05 PROCEDURE — 96374 THER/PROPH/DIAG INJ IV PUSH: CPT

## 2024-09-05 PROCEDURE — 80061 LIPID PANEL: CPT

## 2024-09-05 PROCEDURE — 83036 HEMOGLOBIN GLYCOSYLATED A1C: CPT

## 2024-09-05 PROCEDURE — 95816 EEG AWAKE AND DROWSY: CPT

## 2024-09-05 PROCEDURE — 6360000002 HC RX W HCPCS: Performed by: PSYCHIATRY & NEUROLOGY

## 2024-09-05 PROCEDURE — 93306 TTE W/DOPPLER COMPLETE: CPT

## 2024-09-05 PROCEDURE — 97116 GAIT TRAINING THERAPY: CPT

## 2024-09-05 PROCEDURE — 6370000000 HC RX 637 (ALT 250 FOR IP): Performed by: FAMILY MEDICINE

## 2024-09-05 PROCEDURE — 1100000003 HC PRIVATE W/ TELEMETRY

## 2024-09-05 PROCEDURE — 2580000003 HC RX 258: Performed by: FAMILY MEDICINE

## 2024-09-05 PROCEDURE — 6360000004 HC RX CONTRAST MEDICATION

## 2024-09-05 PROCEDURE — 97165 OT EVAL LOW COMPLEX 30 MIN: CPT

## 2024-09-05 PROCEDURE — 70552 MRI BRAIN STEM W/DYE: CPT

## 2024-09-05 PROCEDURE — 99223 1ST HOSP IP/OBS HIGH 75: CPT | Performed by: PSYCHIATRY & NEUROLOGY

## 2024-09-05 RX ORDER — ATORVASTATIN CALCIUM 40 MG/1
40 TABLET, FILM COATED ORAL DAILY
Status: DISCONTINUED | OUTPATIENT
Start: 2024-09-06 | End: 2024-09-05 | Stop reason: HOSPADM

## 2024-09-05 RX ORDER — ATORVASTATIN CALCIUM 40 MG/1
40 TABLET, FILM COATED ORAL DAILY
Qty: 30 TABLET | Refills: 3 | Status: SHIPPED | OUTPATIENT
Start: 2024-09-06

## 2024-09-05 RX ORDER — LORAZEPAM 2 MG/ML
2 INJECTION INTRAMUSCULAR
Status: COMPLETED | OUTPATIENT
Start: 2024-09-05 | End: 2024-09-05

## 2024-09-05 RX ORDER — ASPIRIN 81 MG/1
81 TABLET, CHEWABLE ORAL DAILY
Qty: 30 TABLET | Refills: 3 | Status: SHIPPED | OUTPATIENT
Start: 2024-09-06

## 2024-09-05 RX ADMIN — ASPIRIN 81 MG CHEWABLE TABLET 81 MG: 81 TABLET CHEWABLE at 09:49

## 2024-09-05 RX ADMIN — GADOTERIDOL 14 ML: 279.3 INJECTION, SOLUTION INTRAVENOUS at 16:54

## 2024-09-05 RX ADMIN — LORAZEPAM 2 MG: 2 INJECTION INTRAMUSCULAR; INTRAVENOUS at 16:31

## 2024-09-05 RX ADMIN — ATORVASTATIN CALCIUM 10 MG: 10 TABLET, FILM COATED ORAL at 09:49

## 2024-09-05 RX ADMIN — SODIUM CHLORIDE, PRESERVATIVE FREE 10 ML: 5 INJECTION INTRAVENOUS at 09:51

## 2024-09-05 NOTE — PLAN OF CARE
Problem: Safety - Adult  Goal: Free from fall injury  Outcome: Progressing  Note: Bed is in the lowest position and wheels are locked, call bell is within reach, bathroom light is on during evening hours, gripper socks are on and patient has been instructed to call out for assistance if needed.     As of now, patient is free from falls and will continue to be monitored.

## 2024-09-05 NOTE — CONSULTS
Neurology Consult Note     NAME: Nicole Lambert-Alvin   :  1975   MRN:  [id number]   DATE:   2024       HPI:  Pt is a 57yo LH female admitted 24  on transfer from [facility] with c/o left arm and leg numbness on awakening and episode of LOC on 24. NIHSS 1 for left arm numbness. In the ED, just after getting the IV, pt had an episode of unresponsiveness with \"contractures of her arms and legs,\" lasted 15-30 seconds, \"somewhat confused afterwards, but was able to answer questions appropriately.\"  CTH neg. CTA H/N no LVO, no stenosis.  MRI brain without contrast was neg.   Patient is seen with her  at the bedside.  She reports that she first noticed the numbness yesterday morning in her left leg and then about an hour later it moved her left arm.  It felt tingly like she had slept on it wrong.  She could move her extremities and could walk, but felt unsteady.  Her symptoms continued to get worse so they went to the ED at 11:30AM after contacting her spine physician Dr. Young, has a history of L4-L5 HNP with recent flareup, but never had numbness associated with it in the past  She also and calling her breast cancer physician Dr. NG.  Patient has a history of breast cancer diagnosed in 2020 treated with bilateral mastectomies chemotherapy and now immunotherapy.  Patient reports her left sided numbness resolved after a couple of hours in the emergency department.  Additionally patient had an episode of LOC on Saturday, 2024.  She been lying on the back porch and started to stand up and the next thing she knew she was on the ground.  She did not have any warning.  Her  heard a thud and immediately was at her side and she was at baseline sitting up on the floor.  While in the ED yesterday she had her IV placed and told her   carotid arteries are patent with no flow-limiting  stenosis.  % of right carotid artery stenosis: 0  % of left carotid artery stenosis: 0  Measurements utilizing NASCET criteria.  NASCET method was utilized for calculating stenosis.    Left vertebral artery is larger than the right vertebral artery. Tortuosity of  the vertebral artery on the left.. The cervical soft tissues are unremarkable.  There are degenerative changes of the cervical spine.    CTA HEAD  There is no major vessel occlusion.Petrous and cavernous carotid arteries are  patent.M1 segments are patent. Symmetric arborization of M2 vessels is  demonstrated. The basilar artery is patent. The proximal P1 segments are patent  bilaterally.There is no aneurysm. There are no sizable posterior communicating  arteries.  No evidence of acute intracranial hemorrhage or midline shift is demonstrated.    Impression  There is no major vessel occlusion.    There is no acute intracranial process.  There is no aneurysm, dissection or hemodynamically significant stenosis.        Electronically signed by SHARON HABIB      Assessment and Plan:   Pt is a 57yo LH female with h/o breast cancer dx in 9/2020 s/p bilateral mastectomy, chemo, and immunotherapy, admitted 9/4/24 with left leg, followed one hour later by left arm numbness, resolved after a few hours in ED.  Additionally, had a spell of LOC on standing 8/31/24, very brief, immediately back to baseline, and in ED just after getting the IV, pt had an episode of LOC with stiffening/flexion of arms, lasted 15-30 seconds, immediately able to answer questions afterwards. Had prodrome of not feeling well and black spots in vision. She also notes that is not uncommon for her to have lightheadedness with standing. CTH neg. CTA H/N no LVO, no stenosis.  MRI brain without contrast was neg.   Exam is non-focal.     Left arm and leg numbness of unclear etiology. TIA is a possibility, but only risk factor is HLD.   Spell of LOC on

## 2024-09-05 NOTE — PROGRESS NOTES
[facility]                                                                                          Hospitalist Progress Note  Maryam Kennedy PA-C  Answering service: [phone] OR [phone] from in house phone        Date of Service:  2024  NAME:  Nicole Caceres  :  1975  MRN:  [id number]       Admission Summary:     Nicole Caceres is a 49 y.o. female past medical history of breast cancer, status post bilateral mastectomy, hyperlipidemia, depression, herniated disc, chronic back pain presents as a direct admission/transfer from [facility] ED to [facility] with chief complaints of numbness of left arm and leg.  Symptoms onset reportedly was noted on awakening this morning.  Symptoms noted remain constant, moderate to severe, without specific alleviating factors.  There is no reports of slurred speech, facial droop, visual disturbance, chest pain, shortness of breath, abdominal pain, fever, chills, bowel/bladder incontinence, or saddle anesthesia.  She was last known well last night at 2300 hrs. on 9/3/2024.  For initial ER reports, patient had syncopal episode with loss of consciousness after mobilizing out of bed on 2024.  Today, initial reported vital signs in ED were temperature 97.9 °F, /83, heart rate 73, respiratory rate 14, O2 saturation 100 send room air.  12-lead EKG shows sinus rhythm, occasional PVCs, PACs, ST/T wave changes in the septal leads 87 bpm.  Blood glucose 137 mg/dL.  CT head without IV contrast showed no acute intracranial normalities.  CTA head and neck with IV contrast showed no major vessel occlusion, no acute intracranial process, no aneurysm, no dissection, no hemodynamically significant flow-limiting stenosis.  Per ER reports, patient had seizure-like versus syncopal episode in the ED.  Patient was assessed NIHSS = 1.  Patient was seen remotely by teleneurologist in consultation who    Transportation Needs: No Transportation Needs (9/4/2024)    PRAPARE - Transportation     Lack of Transportation (Medical): No     Lack of Transportation (Non-Medical): No   Physical Activity: Not on file   Stress: Not on file   Social Connections: Not on file   Intimate Partner Violence: Not on file   Depression: Not at risk (8/23/2022)    PHQ-2     PHQ-2 Score: 0   Housing Stability: Low Risk  (9/4/2024)    Housing Stability Vital Sign     Unable to Pay for Housing in the Last Year: No     Number of Times Moved in the Last Year: 1     Homeless in the Last Year: No   Interpersonal Safety: Not At Risk (9/4/2024)    Interpersonal Safety Domain Source: IP Abuse Screening     Physical abuse: Denies     Verbal abuse: Denies     Emotional abuse: Denies     Financial abuse: Denies     Sexual abuse: Denies   Utilities: Not At Risk (9/4/2024)    [facility] Utilities     Threatened with loss of utilities: No       Review of Systems:   Pertinent items are noted in HPI.       Vital Signs:    Last 24hrs VS reviewed since prior progress note. Most recent are:  Vitals:    09/05/24 0944   BP: 107/78   Pulse: 61   Resp: 18   Temp: 97.4 °F (36.3 °C)   SpO2: 100%       No intake or output data in the 24 hours ending 09/05/24 1400     Physical Examination:     I had a face to face encounter with this patient and independently examined them on 9/5/2024 as outlined below:          General : alert x 3, awake, no acute distress,   HEENT: PEERL, EOMI, moist mucus membrane  Neck: supple, no JVD, no meningeal signs  Chest: Clear to auscultation bilaterally   CVS: S1 S2 heard, Capillary refill less than 2 seconds  Abd: soft/ non tender, non distended, BS physiological,   Ext: no clubbing, no cyanosis, no edema, brisk 2+ DP pulses  Neuro/Psych: pleasant mood and affect, CN 2-12 grossly intact, sensory grossly within normal limit, Strength 5/5 in all extremities  Skin: warm     Data Review:    Review and/or order of clinical lab test      I have

## 2024-09-05 NOTE — PROGRESS NOTES
Speech Pathology Note    Reviewed chart and note SLP orders received as part of stroke order set. Pertinent medical information below. Discussed case with RN and OT who reported no SLP-related concerns    Given pertinent medical information as below and discussion with RN, formal SLP evaluation not clinically indicated at this time. Will sign off. Please re-consult if further needs arise. Thank you.    Chief Complaint   Patient presents with    Back Pain    Numbness     Presenting symptoms: numbness    RN flowsheet documentation   DOC NURSING DYSPHAGIA SCREEN NIHSS   Swallow Screening  Is the patient unable to remain alert for testing?: No  Is the patient on a modified diet (thickened liquids) due to pre-existing dysphagia?: No  Is there presence of existing enteral tube feeding via the stomach or nose?: No  Is there presence of head-of-bed restrictions (less than 30 degrees)?: No  Is there presence of tracheotomy tube?: No  Is the patient ordered nothing-by-mouth status?: No  3 oz Water Swallow Screen: Pass      Current diet:ADULT DIET; Regular; Low Sodium (2 gm) NIHSS Stroke Scale  Interval: Hand-off/Transfer  Level of Consciousness (1a): Alert  LOC Questions (1b): Answers both correctly  LOC Commands (1c): Performs both tasks correctly  Best Gaze (2): Normal  Visual (3): No visual loss  Facial Palsy (4): Normal symmetrical movement  Motor Arm, Left (5a): No drift  Motor Arm, Right (5b): No drift  Motor Leg, Left (6a): No drift  Motor Leg, Right (6b): No drift  Limb Ataxia (7): Absent  Sensory (8): Normal  Best Language (9): No aphasia  Dysarthria (10): Normal  Extinction and Inattention (11): No abnormality  Total: 0     Head imaging   CT HEAD RESULTS MRI RESULTS   CT Head WO Contrast:   Results for orders placed during the hospital encounter of 09/04/24    CT HEAD WO CONTRAST    Narrative  EXAM: CT CODE NEURO HEAD WO CONTRAST    INDICATION: left sided weakness, seizure?    COMPARISON: None.    CONTRAST:  None.    TECHNIQUE: Unenhanced CT of the head was performed using 5 mm images. Brain and  bone windows were generated. Coronal and sagittal reformats. CT dose reduction  was achieved through use of a standardized protocol tailored for this  examination and automatic exposure control for dose modulation.    FINDINGS:  The ventricles and sulci are normal in size, shape and configuration. There is  no significant white matter disease. There is no intracranial hemorrhage,  extra-axial collection, or mass effect. The basilar cisterns are open. No CT  evidence of acute infarct.    The bone windows demonstrate no abnormalities. The visualized portions of the  paranasal sinuses and mastoid air cells are clear.    Impression  No evidence of acute process.        Electronically signed by LENCHO MCKEE   MRI Brain WO Contrast: Results for orders placed during the hospital encounter of 09/04/24    MRI brain without contrast    Narrative  EXAM: MRI BRAIN WO CONTRAST    CLINICAL HISTORY: Syncope/seizure-like activity/strokelike symptoms/left-sided  numbness.    INDICATION: Syncope/seizure-like activity/strokelike symptoms/left-sided  numbness.    COMPARISON: 9/4/2024    TECHNIQUE: MR examination of the brain includes axial and sagittal T1, coronal  T2, axial T2, axial FLAIR, axial gradient echo, axial DWI.  CONTRAST: None  Temporal lobe/seizure protocol was utilized.  FINDINGS:  There is no intracranial mass, hemorrhage or evidence of acute infarction.  Hippocampi are symmetric in size. Hippocampi are symmetric in signal. No  heterotopia or mesial temporal sclerosis.  The brain architecture is normal. There is no evidence of midline shift or  mass-effect. There are no extra-axial fluid collections. There is no Chiari or  syrinx. The pituitary and infundibulum are grossly unremarkable. There is no  skull base mass. Cerebellopontine angles are grossly unremarkable. The major  intracranial vascular flow-voids are unremarkable. The

## 2024-09-05 NOTE — CARE COORDINATION
Care Management Initial Assessment       RUR:  3%  Readmission? No    CM met with pt at bedside to introduce self and role.  Pt lives with  in a multi-level home with 3 steps to enter.     NIELS: pt admitted from home, will return home when medically stable  - no PT/OT needs    Transport: Family     ADLs: independent   DME: none  PCP follow up: PCP confirmed -  has apt [address]  Previous Home Health: none  Previous Skilled Nursing Facility: none  Previous Inpatient Rehab: none  Insurance verified: yes; [employer]  Pharmacy: [facility]  Emergency Contact: Dell Dukes, spouse, [phone]    CM will follow patient progress and assist as needed with NIELS plan.       09/05/24 3577   Service Assessment   Patient Orientation Alert and Oriented;Person;Place;Situation;Self   Cognition Alert   History Provided By Patient   Primary Caregiver Self   Support Systems Spouse/Significant Other   Patient's Healthcare Decision Maker is: Legal Next of Kin   PCP Verified by CM Yes   Last Visit to PCP Within last year  (has apt 9/6)   Prior Functional Level Independent in ADLs/IADLs   Current Functional Level Independent in ADLs/IADLs   Can patient return to prior living arrangement Yes   Ability to make needs known: Good   Social/Functional History   Lives With Spouse   Type of Home House   Home Layout Multi-level   Home Access Stairs to enter with rails   Entrance Stairs - Number of Steps 3   Bathroom Equipment None   Home Equipment None   ADL Assistance Independent   Homemaking Assistance Independent   Homemaking Responsibilities Yes   Ambulation Assistance Independent   Transfer Assistance Independent   Discharge Planning   Type of Residence House   Living Arrangements Spouse/Significant Other   Patient expects to be discharged to: House   Services At/After Discharge   Confirm Follow Up Transport Family     FELICIANO Quintero

## 2024-09-05 NOTE — PROGRESS NOTES
Occupational Therapy  09/05/24    Orders received, chart reviewed and patient evaluated by occupational therapy. Pending progression with skilled acute occupational therapy, recommend:    No skilled occupational therapy    Recommend with nursing patient to complete as able in order to maintain strength, endurance and independence: OOB to chair 3x/day, ADLs ad maria de jesus and performing toileting IND. Thank you for your assistance.     Full evaluation to follow.     Thank you,   BAMBI Ellis, OTR/L

## 2024-09-05 NOTE — DISCHARGE INSTRUCTIONS
Please call 911 or seek immediate medical attention or  return to ED for any acute medical concerns including but not limited to Headache, blurry vision, sore throat, trouble swallowing, trouble with speech, chest pain, shortness of breath, cough, fever, chills, Nausea, vomiting, diarrhea, abdominal pain, urinary symptoms,  focal or generalized neurological symptoms,  weakness in arms or legs, falls, injuries, rashes, , blood in stool, urine, sputum or any other medical concern.

## 2024-09-05 NOTE — PROGRESS NOTES
Read and reviewed discharge instructions with patient and patients family. Patient and patients family demonstrated understnd

## 2024-09-05 NOTE — PROCEDURES
PROCEDURE: ROUTINE INPATIENT EEG  NAME:   Nicole Lambert-[place]  ACCOUNT NUMBER : [id number]  MRN:   [id number]  DATE OF SERVICE: 9/5/2024     HISTORY/INDICATION: Patient is a 58-year-old LH female with history of breast cancer admitted with left arm and leg numbness and reported a history of LOC on 8/31/2024 and had an second event in the emergency department associated with having her IV placed.  EEG is performed to fully exclude epilepsy as an underlying etiology.    MEDICATIONS:   Current Facility-Administered Medications   Medication Dose Route Frequency Provider Last Rate Last Admin    [START ON 9/6/2024] atorvastatin (LIPITOR) tablet 40 mg  40 mg Oral Daily Imani Polanco MD        sodium chloride flush 0.9 % injection 5-40 mL  5-40 mL IntraVENous 2 times per day Chris Alberto MD   10 mL at 09/05/24 0951    sodium chloride flush 0.9 % injection 5-40 mL  5-40 mL IntraVENous PRN Chris Alberto MD        0.9 % sodium chloride infusion   IntraVENous PRN Chris Alberto MD        ondansetron (ZOFRAN-ODT) disintegrating tablet 4 mg  4 mg Oral Q8H PRN Chris Alberto MD        Or    ondansetron (ZOFRAN) injection 4 mg  4 mg IntraVENous Q6H PRN Chris Alberto MD        polyethylene glycol (GLYCOLAX) packet 17 g  17 g Oral Daily PRN Chris Alberto MD        acetaminophen (TYLENOL) tablet 650 mg  650 mg Oral Q4H PRN Chris Alberto MD        Or    acetaminophen (TYLENOL) suppository 650 mg  650 mg Rectal Q4H PRN Chris Alberto MD        aspirin chewable tablet 81 mg  81 mg Oral Daily Chris Alberto MD   81 mg at 09/05/24 0949    Or    aspirin suppository 300 mg  300 mg Rectal Daily Chris Alberto MD        tamoxifen (NOLVADEX) tablet 20 mg  20 mg Oral Nightly Chris Alberto MD   20 mg at 09/04/24 2251    venlafaxine (EFFEXOR) tablet 37.5 mg  37.5 mg Oral Nightly Chris Alberto MD   37.5 mg at 09/04/24 2251       CONDITIONS OF RECORDING: This is a routine 21-channel EEG recording

## 2024-09-05 NOTE — PROGRESS NOTES
MRI brain without contrast is negative on my review, report pending.  TTE is normal, EF 60-65%, no PFO  EEG is normal.     TIA vs peripheral numbness. Cannot exclude cervical etiology, but no evidence of myelopathy or radiculopathy on exam.   Stable for d/c from neuro standpoint. See recommendations from earlier progress note.   Follow up with PCP.  D/w RN and family.  RN will contact Dr. Moreno, supervising MD as the PA has left for the day.

## 2024-09-05 NOTE — PLAN OF CARE
Problem: Discharge Planning  Goal: Discharge to home or other facility with appropriate resources  Outcome: Progressing  Flowsheets (Taken 9/5/2024 0800)  Discharge to home or other facility with appropriate resources: Identify barriers to discharge with patient and caregiver     Problem: Safety - Adult  Goal: Free from fall injury  Outcome: Progressing  Flowsheets (Taken 9/5/2024 0800)  Free From Fall Injury: Instruct family/caregiver on patient safety

## 2024-09-05 NOTE — PROGRESS NOTES
OCCUPATIONAL THERAPY EVALUATION/DISCHARGE  Patient: Nicole Lambert-[place] (49 y.o. female)  Date: 9/5/2024  Primary Diagnosis: Seizure-like activity (HCC) [R56.9]  Left sided numbness [R20.0]  Stroke-like symptoms [R29.90]  Stroke-like episode [R29.90]         Precautions:                    ASSESSMENT :  Based on the objective data below, the patient presents back to her baseline s/p admission for L sided numbness and seizure-like activity, imaging negative for acute process. She is highly IND and working at baseline, lives with her family, drives, hx of back pain, currently working with OP PT. She now presents back to baseline, sensation deficits resolved, no focal neuro deficits. She is IND for ADLs/IADLs and mobility with no AD, only noted to be briefly orthostatic with initial transition from semi fowlers to EOB, asymptomatic and not worsening with activity. No further acute OT needs, safe to d/c home once medically cleared.     Patient Vitals:   Pulse BP Patient Position   09/05/24 0944 61 107/78 Semi fowlers (post activity)   09/05/24 0921 66 120/79 Sitting   09/05/24 0918 85 114/76 Standing   09/05/24 0913 74 103/78 Sitting   09/05/24 0912 66 122/85 Supine (pre activity)   ]  Functional Outcome Measure:  The patient scored 24/24 on the AM-PAC outcome measure which is indicative of lower odds of d/c to rehab.      Further skilled acute occupational therapy is not indicated at this time.     PLAN :  Recommend with staff: up ad maria de jesus, ADLs in bathroom, hallway mobility    Recommendation for discharge: (in order for the patient to meet his/her long term goals):   No skilled occupational therapy    Other factors to consider for discharge: no additional factors    IF patient discharges home will need the following DME: none     SUBJECTIVE:   Patient stated, “I feel fine now.”    OBJECTIVE DATA SUMMARY:     Past Medical History:   Diagnosis Date    Breast cancer (HCC)     Hypercholesterolemia      Past Surgical

## 2024-09-05 NOTE — PROGRESS NOTES
PHYSICAL THERAPY EVALUATION/DISCHARGE    Patient: Nicole Lambert-[place] (49 y.o. female)  Date: 9/5/2024  Primary Diagnosis: Seizure-like activity (HCC) [R56.9]  Left sided numbness [R20.0]  Stroke-like symptoms [R29.90]  Stroke-like episode [R29.90]       Precautions:       ASSESSMENT AND RECOMMENDATIONS:  Based on the objective data below, the patient presented with reports of LUE+LLE numbness and recent fall. Pt with an episode of unresponsiveness and seizure-like activity while in the ER. Brain MRI negative for acute process. Pt demonstrated intact and equal BLE strength and sensation. Pt completed bed mobility and transfers independently, and gait training without LOB or apparent gait abnormalities. Noted 20 point drop in SBP from supine to sit and denied dizziness. Pt educated on slow transitional movements as well as general back precautions with history of lumbar pain. Pt appears to be mobilizing at baseline functional status with no further acute PT needs.   Pulse BP Patient Position   09/05/24 0921 66 120/79 Sitting   09/05/24 0918 85 114/76 Standing   09/05/24 0913 74 103/78 Sitting   09/05/24 0912 66 122/85 Supine     Functional Outcome Measure:  The patient scored 56/56 on the Stein Balance outcome measure which is indicative of low risk for falls      Further skilled acute physical therapy is not indicated at this time.       PLAN :  Recommendation for discharge: (in order for the patient to meet his/her long term goals):   Resume OPPT for lumbar pain    Other factors to consider for discharge: no additional factors    IF patient discharges home will need the following DME: none       SUBJECTIVE:   Patient stated “I've been going to outpatient PT for my back for about 2-3 weeks.”    OBJECTIVE DATA SUMMARY:     Past Medical History:   Diagnosis Date    Breast cancer (HCC)     Hypercholesterolemia      Past Surgical History:   Procedure Laterality Date    GYN  1993    cone biopsy    MASTECTOMY,  LUE/LLE numbness(resolved) as this relates to her diagnosis of CVA/TIA r/o.  She demonstrated good  understanding.    Patient and/or family was verbally and visually educated on the BE FAST acronym for signs/symptoms of CVA and TIA.  All questions answered with patient indicating good  understanding.     Stein Balance Test:    Stein Balance Scale  1. Sitting to Standing: Able to stand without using hands and stabilize independently  2. Standing Unsupported: Able to stand safely for 2 minutes  3. Sitting with Back Unsupported but Feet Supported on Floor or on a Stool: Able to sit safely and securely for 2 minutes  4. Standing to Sitting: Sits safely with minimal use of hands  5.  Transfers: Able to transfer safely with minor use of hands  6. Standing Unsupported with Eyes Closed: Able to stand 10 seconds safely  7. Standing Unsupported with Feet Together: Able to place feet together independently and stand 1 minute safely  8. Reach Forward with Outstretched Arm While Standing: Can reach forward confidently 25 cm (10 inches)  9.  Object from Floor from a Standing Position: Able to  slipper safely and easily  10. Turning to Look Behind Over Left and Right Shoulders While Standing: Looks behind from both sides and weight shifts well  11. Turn 360 Degrees: Able to turn 360 degrees safely in 4 seconds or less  12. Place Alternate Foot on Step or Stool While Standing Unsupported: Able to stand independently and safely and complete 8 steps in 20 seconds  13. Standing Unsupported One Foot in Front: Able to place foot tandem independently and hold 30 seconds  14. Standing on One Leg: Able to lift leg independently and hold greater than 10 seconds  Stein Balance Score: 56         56=Maximum possible score;   0-20=High fall risk  21-40=Moderate fall risk   41-56=Low fall risk

## 2024-09-05 NOTE — H&P
History and Physical    Date of Service:  9/4/2024  Primary Care Provider: Evelin Lees PA  Source of information: The patient and Chart review    Chief Complaint: Back Pain and Numbness      History of Presenting Illness:   Nicole Caceres is a 49 y.o. female past medical history of breast cancer, status post bilateral mastectomy, hyperlipidemia, depression, herniated disc, chronic back pain presents as a direct admission/transfer from [facility] ED to [facility] with chief complaints of numbness of left arm and leg.  Symptoms onset reportedly was noted on awakening this morning.  Symptoms noted remain constant, moderate to severe, without specific alleviating factors.  There is no reports of slurred speech, facial droop, visual disturbance, chest pain, shortness of breath, abdominal pain, fever, chills, bowel/bladder incontinence, or saddle anesthesia.  She was last known well last night at 2300 hrs. on 9/3/2024.  For initial ER reports, patient had syncopal episode with loss of consciousness after mobilizing out of bed on 8/31/2024.  Today, initial reported vital signs in ED were temperature 97.9 °F, /83, heart rate 73, respiratory rate 14, O2 saturation 100 send room air.  12-lead EKG shows sinus rhythm, occasional PVCs, PACs, ST/T wave changes in the septal leads 87 bpm.  Blood glucose 137 mg/dL.  CT head without IV contrast showed no acute intracranial normalities.  CTA head and neck with IV contrast showed no major vessel occlusion, no acute intracranial process, no aneurysm, no dissection, no hemodynamically significant flow-limiting stenosis.  Per ER reports, patient had seizure-like versus syncopal episode in the ED.  Patient was assessed NIHSS = 1.  Patient was seen remotely by teleneurologist in consultation who recommended admission for stroke workup.  Tonight, patient arrived status post transfer to [facility].  On arrival, she has seen for admission to the

## 2024-09-09 NOTE — PROGRESS NOTES
Physician Progress Note      PATIENT:               TYLER PAULSON  [facility] #:                  [id number]  :                       1975  ADMIT DATE:       2024 11:50 AM  DISCH DATE:        2024 7:31 PM  RESPONDING  PROVIDER #:        Maryam Kennedy PA-C          QUERY TEXT:    Pt admitted with stroke-like symptoms L sided weakness. If possible, please   document in progress notes and discharge summary the etiology.    The medical record reflects the following:  Risk Factors: Stroke-like s/s, Seizure    Clinical Indicators: L sided weakness    Treatment: Neurology consulted  -Place on neuro checks and fall precautions  -Order TTE  -Order orthostatic vital signs  -Consult PT/OT  -Place on seizure precautions  -Order EEG  -Ativan prn for Seizure    Please call if you have any questions or need assistance. I can also be   reached via [employer] or [employer] # [phone].  Thank you,  Elle Vo RN/CDI  Options provided:  -- L sided weakness due to _______, Please specify and document cause  -- Other - I will add my own diagnosis  -- Disagree - Not applicable / Not valid  -- Disagree - Clinically unable to determine / Unknown  -- Refer to Clinical Documentation Reviewer    PROVIDER RESPONSE TEXT:    This patient has L sided weakness due to _________. TIA vs peripheral numbness    Query created by: Elle Vo on 2024 2:12 PM      Electronically signed by:  Maryam Kennedy PA-C 2024 2:30 PM